# Patient Record
Sex: FEMALE | Race: OTHER | HISPANIC OR LATINO | URBAN - METROPOLITAN AREA
[De-identification: names, ages, dates, MRNs, and addresses within clinical notes are randomized per-mention and may not be internally consistent; named-entity substitution may affect disease eponyms.]

---
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
## 2025-02-26 ENCOUNTER — APPOINTMENT (OUTPATIENT)
Dept: ANTEPARTUM | Facility: CLINIC | Age: 20
End: 2025-02-26
Payer: MEDICAID

## 2025-02-26 ENCOUNTER — ASOB RESULT (OUTPATIENT)
Age: 20
End: 2025-02-26

## 2025-02-26 PROCEDURE — 76813 OB US NUCHAL MEAS 1 GEST: CPT

## 2025-04-23 ENCOUNTER — ASOB RESULT (OUTPATIENT)
Age: 20
End: 2025-04-23

## 2025-04-23 ENCOUNTER — APPOINTMENT (OUTPATIENT)
Dept: ANTEPARTUM | Facility: CLINIC | Age: 20
End: 2025-04-23
Payer: MEDICAID

## 2025-04-23 PROCEDURE — 76811 OB US DETAILED SNGL FETUS: CPT

## 2025-04-23 PROCEDURE — 76817 TRANSVAGINAL US OBSTETRIC: CPT

## 2025-08-13 ENCOUNTER — APPOINTMENT (OUTPATIENT)
Dept: ANTEPARTUM | Facility: CLINIC | Age: 20
End: 2025-08-13
Payer: MEDICAID

## 2025-08-13 ENCOUNTER — ASOB RESULT (OUTPATIENT)
Age: 20
End: 2025-08-13

## 2025-08-13 PROBLEM — Z00.00 ENCOUNTER FOR PREVENTIVE HEALTH EXAMINATION: Status: ACTIVE | Noted: 2025-08-13

## 2025-08-13 PROCEDURE — 76816 OB US FOLLOW-UP PER FETUS: CPT

## 2025-08-13 PROCEDURE — 76819 FETAL BIOPHYS PROFIL W/O NST: CPT
